# Patient Record
Sex: FEMALE | Race: WHITE | NOT HISPANIC OR LATINO | Employment: UNEMPLOYED | URBAN - NONMETROPOLITAN AREA
[De-identification: names, ages, dates, MRNs, and addresses within clinical notes are randomized per-mention and may not be internally consistent; named-entity substitution may affect disease eponyms.]

---

## 2020-02-07 ENCOUNTER — TELEPHONE (OUTPATIENT)
Dept: FAMILY MEDICINE CLINIC | Facility: CLINIC | Age: 40
End: 2020-02-07

## 2020-02-07 ENCOUNTER — OFFICE VISIT (OUTPATIENT)
Dept: FAMILY MEDICINE CLINIC | Facility: CLINIC | Age: 40
End: 2020-02-07

## 2020-02-07 VITALS
DIASTOLIC BLOOD PRESSURE: 70 MMHG | SYSTOLIC BLOOD PRESSURE: 100 MMHG | HEART RATE: 101 BPM | WEIGHT: 129 LBS | TEMPERATURE: 97.2 F | HEIGHT: 66 IN | OXYGEN SATURATION: 100 % | BODY MASS INDEX: 20.73 KG/M2

## 2020-02-07 DIAGNOSIS — R52 PAIN: ICD-10-CM

## 2020-02-07 DIAGNOSIS — K04.7 DENTAL ABSCESS: Primary | ICD-10-CM

## 2020-02-07 PROCEDURE — 99203 OFFICE O/P NEW LOW 30 MIN: CPT | Performed by: NURSE PRACTITIONER

## 2020-02-07 RX ORDER — IBUPROFEN 600 MG/1
600 TABLET ORAL EVERY 8 HOURS PRN
Qty: 30 TABLET | Refills: 0 | Status: SHIPPED | OUTPATIENT
Start: 2020-02-07 | End: 2020-02-08 | Stop reason: DRUGHIGH

## 2020-02-07 RX ORDER — AMOXICILLIN AND CLAVULANATE POTASSIUM 875; 125 MG/1; MG/1
1 TABLET, FILM COATED ORAL 2 TIMES DAILY
Qty: 20 TABLET | Refills: 0 | Status: SHIPPED | OUTPATIENT
Start: 2020-02-07 | End: 2020-02-08 | Stop reason: ALTCHOICE

## 2020-02-07 NOTE — PROGRESS NOTES
Subjective   Elis Killian is a 39 y.o. female. Dental pain.    History of Present Illness Pt presents today for dental pain. She says she started having pain around a month ago but it has became severe in the past few days. Pain is located on bottom right side of teeth and jaw. At home she has been gargling salt water and says it provided little to no pain relief.    The following portions of the patient's history were reviewed and updated as appropriate: allergies, current medications, past family history, past medical history, past social history, past surgical history and problem list.    Review of Systems   Constitutional: Negative for chills, fatigue and fever.   HENT: Positive for dental problem (dental pain). Negative for congestion, ear pain, rhinorrhea and sore throat.    Eyes: Negative for blurred vision, double vision and visual disturbance.   Respiratory: Negative for cough, chest tightness, shortness of breath and wheezing.    Cardiovascular: Negative for chest pain, palpitations and leg swelling.   Gastrointestinal: Negative for abdominal pain, diarrhea, nausea and vomiting.   Endocrine: Negative for cold intolerance and heat intolerance.   Genitourinary: Negative for difficulty urinating, dysuria, frequency and hematuria.   Musculoskeletal: Negative for arthralgias, back pain, neck pain and neck stiffness.   Skin: Negative for dry skin, pallor, rash, skin lesions and bruise.   Allergic/Immunologic: Negative for environmental allergies, food allergies and immunocompromised state.   Neurological: Negative for dizziness, syncope, weakness, light-headedness, headache and confusion.   Hematological: Negative for adenopathy. Does not bruise/bleed easily.   Psychiatric/Behavioral: Negative for self-injury, sleep disturbance, suicidal ideas, depressed mood and stress. The patient is not nervous/anxious.        Objective   Physical Exam   Constitutional: She is oriented to person, place, and time. Vital  signs are normal. She appears well-developed and well-nourished. She is cooperative. She does not have a sickly appearance. She does not appear ill. No distress.   HENT:   Head: Normocephalic.   Mouth/Throat: Oropharynx is clear and moist and mucous membranes are normal. Abnormal dentition. Dental abscesses and dental caries present. Tonsils are 1+ on the right. Tonsils are 1+ on the left. No tonsillar exudate.       Eyes: Pupils are equal, round, and reactive to light. Conjunctivae, EOM and lids are normal.   Neck: Normal range of motion and full passive range of motion without pain. Neck supple.   Musculoskeletal: Normal range of motion.   Neurological: She is alert and oriented to person, place, and time. She has normal strength. Coordination and gait normal.   Skin: Skin is warm, dry and intact. She is not diaphoretic. No pallor.   Psychiatric: She has a normal mood and affect. Her speech is normal and behavior is normal. Judgment and thought content normal.     Vitals:    02/07/20 1431   BP: 100/70   Pulse: 101   Temp: 97.2 °F (36.2 °C)   SpO2: 100%         Assessment/Plan   Elis was seen today for dental pain.    Diagnoses and all orders for this visit:    Dental abscess  -     amoxicillin-clavulanate (AUGMENTIN) 875-125 MG per tablet; Take 1 tablet by mouth 2 (Two) Times a Day for 10 days.    Pain  -     ibuprofen (ADVIL,MOTRIN) 600 MG tablet; Take 1 tablet by mouth Every 8 (Eight) Hours As Needed for Mild Pain  or Moderate Pain .    Dental abscess- Pt instructed to take Augmentin BID for 10 days, warned of GI upset, take with food to reduce GI upset. Stressed importance of seeing dentist antibiotic is only temporary fix, she will probably need teeth pulled. Ibuprofen 600 mg every 8 hours PRN for pain.  If symptoms do not improve or worsen, patient was instructed to return to clinic for further evaluation.         This document has been electronically signed by AYAAN Luong on  February 7, 2020 2:55  PM

## 2020-02-07 NOTE — PATIENT INSTRUCTIONS
Dental Abscess    A dental abscess is a collection of pus in or around a tooth that results from an infection. An abscess can cause pain in the affected area as well as other symptoms. Treatment is important to help with symptoms and to prevent the infection from spreading.  What are the causes?  This condition is caused by a bacterial infection around the root of the tooth that involves the inner part of the tooth (pulp). It may result from:  · Severe tooth decay.  · Trauma to the tooth, such as a broken or chipped tooth, that allows bacteria to enter into the pulp.  · Severe gum disease around a tooth.  What increases the risk?  This condition is more likely to develop in males. It is also more likely to develop in people who:  · Have dental decay (cavities).  · Eat sugary snacks between meals.  · Use tobacco products.  · Have diabetes.  · Have a weakened disease-fighting system (immune system).  · Do not brush and care for their teeth regularly.  What are the signs or symptoms?  Symptoms of this condition include:  · Severe pain in and around the infected tooth.  · Swelling and redness around the infected tooth, in the mouth, or in the face.  · Tenderness.  · Pus drainage.  · Bad breath.  · Bitter taste in the mouth.  · Difficulty swallowing.  · Difficulty opening the mouth.  · Nausea.  · Vomiting.  · Chills.  · Swollen neck glands.  · Fever.  How is this diagnosed?  This condition is diagnosed based on:  · Your symptoms and your medical and dental history.  · An examination of the infected tooth. During the exam, your dentist may tap on the infected tooth.  You may also have X-rays of the affected area.  How is this treated?  This condition is treated by getting rid of the infection. This may be done with:  · Incision and drainage. This procedure is done by making an incision in the abscess to drain out the pus. Removing pus is the first priority in treating an abscess.  · Antibiotic medicines. These may be used  in certain situations.  · Antibacterial mouth rinse.  · A root canal. This may be performed to save the tooth. Your dentist accesses the visible part of your tooth (crown) with a drill and removes any damaged pulp. Then the space is filled and sealed off.  · Tooth extraction. The tooth is pulled out if it cannot be saved by other treatment.  You may also receive treatment for pain, such as:  · Acetaminophen or NSAIDs.  · Gels that contain a numbing medicine.  · An injection to block the pain near your nerve.  Follow these instructions at home:  Medicines  · Take over-the-counter and prescription medicines only as told by your dentist.  · If you were prescribed an antibiotic, take it as told by your dentist. Do not stop taking the antibiotic even if you start to feel better.  · If you were prescribed a gel that contains a numbing medicine, use it exactly as told in the directions. Do not use these gels for children who are younger than 2 years of age.  · Do not drive or use heavy machinery while taking prescription pain medicine.  General instructions  · Rinse out your mouth often with salt water to relieve pain or swelling. To make a salt-water mixture, completely dissolve ½-1 tsp of salt in 1 cup of warm water.  · Eat a soft diet while your abscess is healing.  · Drink enough fluid to keep your urine pale yellow.  · Do not apply heat to the outside of your mouth.  · Do not use any products that contain nicotine or tobacco, such as cigarettes and e-cigarettes. If you need help quitting, ask your health care provider.  · Keep all follow-up visits as told by your dentist. This is important.  How is this prevented?  · Brush your teeth every morning and night with fluoride toothpaste. Floss one time each day.  · Get regularly scheduled dental cleanings.  · Consider having a dental sealant applied on teeth that have deep holes (caries).  · Drink fluoridated water regularly. This includes most tap water. Check the label  on bottled water to see if it contains fluoride.  · Drink water instead of sugary drinks.  · Eat healthy meals and snacks.  · Wear a mouth guard or face shield to protect your teeth while playing sports.  Contact a health care provider if:  · Your pain is worse and is not helped by medicine.  Get help right away if:  · You have a fever or chills.  · Your symptoms suddenly get worse.  · You have a very bad headache.  · You have problems breathing or swallowing.  · You have trouble opening your mouth.  · You have swelling in your neck or around your eye.  Summary  · A dental abscess is a collection of pus in or around a tooth that results from an infection.  · A dental abscess may result from severe tooth decay, trauma to the tooth, or severe gum disease around a tooth.  · Symptoms include severe pain, swelling, redness, and drainage of pus in and around the infected tooth.  · The first priority in treating a dental abscess is to drain out the pus. Treatment may also involve removing damage inside the tooth (root canal) or pulling out (extracting) the tooth.  This information is not intended to replace advice given to you by your health care provider. Make sure you discuss any questions you have with your health care provider.  Document Released: 12/18/2006 Document Revised: 08/20/2018 Document Reviewed: 08/20/2018  [employer] Interactive Patient Education © 2019 [employer]

## 2020-02-08 ENCOUNTER — HOSPITAL ENCOUNTER (EMERGENCY)
Facility: HOSPITAL | Age: 40
Discharge: HOME | End: 2020-02-08
Attending: FAMILY MEDICINE | Admitting: FAMILY MEDICINE

## 2020-02-08 VITALS
OXYGEN SATURATION: 98 % | TEMPERATURE: 98.1 F | HEIGHT: 66 IN | WEIGHT: 129.8 LBS | DIASTOLIC BLOOD PRESSURE: 57 MMHG | HEART RATE: 88 BPM | BODY MASS INDEX: 20.86 KG/M2 | RESPIRATION RATE: 18 BRPM | SYSTOLIC BLOOD PRESSURE: 103 MMHG

## 2020-02-08 DIAGNOSIS — K04.7 DENTAL ABSCESS: Primary | ICD-10-CM

## 2020-02-08 PROCEDURE — 99283 EMERGENCY DEPT VISIT LOW MDM: CPT

## 2020-02-08 RX ORDER — LIDOCAINE HYDROCHLORIDE 20 MG/ML
10 SOLUTION OROPHARYNGEAL
Status: DISCONTINUED | OUTPATIENT
Start: 2020-02-08 | End: 2020-02-08 | Stop reason: HOSPADM

## 2020-02-08 RX ORDER — IBUPROFEN 600 MG/1
600 TABLET ORAL EVERY 6 HOURS PRN
Qty: 30 TABLET | Refills: 0 | Status: SHIPPED | OUTPATIENT
Start: 2020-02-08 | End: 2020-02-08 | Stop reason: SDUPTHER

## 2020-02-08 RX ORDER — LIDOCAINE HYDROCHLORIDE 20 MG/ML
SOLUTION OROPHARYNGEAL
Status: COMPLETED
Start: 2020-02-08 | End: 2020-02-08

## 2020-02-08 RX ORDER — IBUPROFEN 600 MG/1
600 TABLET ORAL EVERY 6 HOURS PRN
Qty: 20 TABLET | Refills: 0 | Status: SHIPPED | OUTPATIENT
Start: 2020-02-08 | End: 2020-02-13

## 2020-02-08 RX ORDER — IBUPROFEN 600 MG/1
600 TABLET ORAL ONCE
Status: COMPLETED | OUTPATIENT
Start: 2020-02-08 | End: 2020-02-08

## 2020-02-08 RX ORDER — AMOXICILLIN 875 MG/1
875 TABLET, COATED ORAL 2 TIMES DAILY
Qty: 20 TABLET | Refills: 0 | Status: SHIPPED | OUTPATIENT
Start: 2020-02-08 | End: 2020-02-18

## 2020-02-08 RX ORDER — HYDROCODONE BITARTRATE AND ACETAMINOPHEN 5; 325 MG/1; MG/1
1 TABLET ORAL EVERY 6 HOURS PRN
Qty: 5 TABLET | Refills: 0 | Status: SHIPPED | OUTPATIENT
Start: 2020-02-08

## 2020-02-08 RX ADMIN — LIDOCAINE HYDROCHLORIDE 10 ML: 20 SOLUTION OROPHARYNGEAL at 11:20

## 2020-02-08 RX ADMIN — LIDOCAINE HYDROCHLORIDE 10 ML: 20 SOLUTION ORAL; TOPICAL at 11:20

## 2020-02-08 RX ADMIN — IBUPROFEN 600 MG: 600 TABLET ORAL at 12:34

## 2020-02-08 NOTE — ED PROVIDER NOTES
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  She was diagnosed yesterday with a abscess and given Augmentin, which she is taking 2 pills.  She says she vomited the pills, and feels like her abscess is getting worse.  She is taking Tylenol for pain, which she feels is not helping.  She denies fevers/chills.      History provided by:  Patient   used: No    Abscess   Abscess location: right lower gumline.  Abscess quality: painful    Abscess quality: not draining    Red streaking: no    Progression:  Worsening  Pain details:     Quality:  Pressure    Severity:  Moderate    Duration:  4 days    Timing:  Constant    Progression:  Worsening  Chronicity:  New  Associated symptoms: no headaches        Review of Systems   Constitutional: Negative for activity change and appetite change.   HENT: Negative for congestion and ear pain.         Right lower jaw pain.   Eyes: Negative for pain and discharge.   Respiratory: Negative for chest tightness and shortness of breath.    Cardiovascular: Negative for chest pain and palpitations.   Gastrointestinal: Negative for abdominal distention and abdominal pain.   Endocrine: Negative for cold intolerance and heat intolerance.   Genitourinary: Negative for difficulty urinating and dysuria.   Musculoskeletal: Negative for arthralgias and back pain.   Skin: Negative for color change and rash.   Allergic/Immunologic: Negative for environmental allergies and food allergies.   Neurological: Negative for dizziness and headaches.   Hematological: Negative for adenopathy. Does not bruise/bleed easily.   Psychiatric/Behavioral: Negative for agitation and confusion.       History reviewed. No pertinent past medical history.    No Known Allergies    History reviewed. No pertinent surgical history.    History reviewed. No pertinent family history.    Social History     Socioeconomic History   • Marital status:      Spouse name: Not on  file   • Number of children: Not on file   • Years of education: Not on file   • Highest education level: Not on file   Tobacco Use   • Smoking status: Current Every Day Smoker     Packs/day: 0.25     Years: 15.00     Pack years: 3.75     Types: Cigarettes   • Smokeless tobacco: Never Used   Substance and Sexual Activity   • Alcohol use: Not Currently   • Drug use: Not Currently   • Sexual activity: Defer           Objective   Physical Exam   Constitutional: She is oriented to person, place, and time. She appears well-developed and well-nourished.   HENT:   Head: Normocephalic and atraumatic.   Very poor dentition.  Right lower jaw on the gumline appears reddened, no area of drainage, to partial teeth in place.  No Noman's angina appreciated.  No cervical lymphadenopathy appreciated.   Eyes: Pupils are equal, round, and reactive to light. EOM are normal.   Neck: No JVD present. No tracheal deviation present. No thyromegaly present.   Cardiovascular: Normal rate, S1 normal, S2 normal, normal heart sounds and intact distal pulses.   Pulses:       Radial pulses are 2+ on the right side, and 2+ on the left side.        Dorsalis pedis pulses are 2+ on the right side, and 2+ on the left side.   Pulmonary/Chest: Effort normal and breath sounds normal.   Abdominal: Bowel sounds are normal.   Musculoskeletal: Normal range of motion.   Neurological: She is alert and oriented to person, place, and time. GCS eye subscore is 4. GCS verbal subscore is 5. GCS motor subscore is 6.   Skin: Skin is warm and dry. Capillary refill takes 2 to 3 seconds.   Psychiatric: Her speech is normal and behavior is normal. Thought content normal.   Crying during exam.       Procedures           ED Course  ED Course as of Feb 08 1232   Sat Feb 08, 2020   1137 Patient is unable to tolerate her antibiotics.  We will change her antibiotics from Augmentin to amoxicillin.  She will be given lidocaine soaked cotton balls for use here, and to take home.   Should continued to complain of continued pain after lidocaine cottonball.  Gave her some ibuprofen.  Will send home with 5 tablets of Norco 5/325 mg.  Andrew is down at this time and unable to check prior prescriptions.  She will be discharged home to follow-up with her doctor and dentist.    [KVNG]      ED Course User Index  [KVNG] Ynes Levine MD                                               MDM  Number of Diagnoses or Management Options  Dental abscess:   Risk of Complications, Morbidity, and/or Mortality  General comments: She is unable to tolerate her antibiotics.  Antibiotics were changed and she was provided with Lidocaine soaked cotton balls for topical relief over her abscess site.  Will be discharged home with follow-up with PCP and dentist.    Critical Care  Total time providing critical care: < 30 minutes    Patient Progress  Patient progress: improved      Final diagnoses:   Dental abscess     Ynes Levine MD PGY-1      This document has been electronically signed by Ynes Levine MD on February 8, 2020 12:32 PM           Ynes Levine MD  Resident  02/08/20 0132       Ynes Levine MD  Resident  02/08/20 1239

## 2020-02-08 NOTE — DISCHARGE INSTRUCTIONS
Use the soaked cotton balls for pain control.  Continue with Tylenol and/or ibuprofen for pain.  The new antibiotics that we gave you, and follow-up with your doctor and dentist.